# Patient Record
Sex: MALE | Race: WHITE | HISPANIC OR LATINO | Employment: FULL TIME | URBAN - METROPOLITAN AREA
[De-identification: names, ages, dates, MRNs, and addresses within clinical notes are randomized per-mention and may not be internally consistent; named-entity substitution may affect disease eponyms.]

---

## 2019-06-01 ENCOUNTER — HOSPITAL ENCOUNTER (EMERGENCY)
Facility: HOSPITAL | Age: 29
Discharge: HOME | End: 2019-06-01
Attending: EMERGENCY MEDICINE

## 2019-06-01 VITALS
HEART RATE: 72 BPM | OXYGEN SATURATION: 98 % | WEIGHT: 183 LBS | TEMPERATURE: 99 F | HEIGHT: 69 IN | BODY MASS INDEX: 27.11 KG/M2 | DIASTOLIC BLOOD PRESSURE: 72 MMHG | SYSTOLIC BLOOD PRESSURE: 130 MMHG | RESPIRATION RATE: 18 BRPM

## 2019-06-01 DIAGNOSIS — R60.9 SWELLING: ICD-10-CM

## 2019-06-01 DIAGNOSIS — M25.461 EFFUSION OF RIGHT PREPATELLAR BURSA: Primary | ICD-10-CM

## 2019-06-01 PROCEDURE — 25000003 PHARM REV CODE 250: Performed by: NURSE PRACTITIONER

## 2019-06-01 PROCEDURE — 99283 EMERGENCY DEPT VISIT LOW MDM: CPT

## 2019-06-01 RX ORDER — NAPROXEN 500 MG/1
500 TABLET ORAL 2 TIMES DAILY WITH MEALS
Qty: 20 TABLET | Refills: 0 | Status: SHIPPED | OUTPATIENT
Start: 2019-06-01 | End: 2019-12-03 | Stop reason: CLARIF

## 2019-06-01 RX ORDER — NAPROXEN 500 MG/1
500 TABLET ORAL
Status: COMPLETED | OUTPATIENT
Start: 2019-06-01 | End: 2019-06-01

## 2019-06-01 RX ADMIN — NAPROXEN 500 MG: 500 TABLET ORAL at 08:06

## 2019-06-02 NOTE — ED PROVIDER NOTES
Encounter Date: 6/1/2019       History     Chief Complaint   Patient presents with    Knee Pain     Patient presents to the ED with reports of having right knee swelling x 1 week. Denies any pain.      27 y/o male with no reported PMH presents for painless R knee swelling x 2 days.  Pt states about 1 week ago he hit his knee with a hammer while at work but there was no swelling until two days ago. Pt denies F, pain, decreased ROM or N/V.    The history is provided by the patient.     Review of patient's allergies indicates:  No Known Allergies  History reviewed. No pertinent past medical history.  History reviewed. No pertinent surgical history.  History reviewed. No pertinent family history.  Social History     Tobacco Use    Smoking status: Never Smoker   Substance Use Topics    Alcohol use: No    Drug use: No     Review of Systems   Constitutional: Negative for activity change and fever.   Musculoskeletal: Positive for joint swelling. Negative for gait problem.   Skin: Positive for color change. Negative for rash and wound.   Allergic/Immunologic: Negative for immunocompromised state.   Neurological: Negative for weakness and numbness.   Hematological: Does not bruise/bleed easily.   All other systems reviewed and are negative.      Physical Exam     Initial Vitals [06/01/19 1935]   BP Pulse Resp Temp SpO2   128/73 73 16 98.5 °F (36.9 °C) 97 %      MAP       --         Physical Exam    Nursing note and vitals reviewed.  Constitutional: Vital signs are normal. He appears well-developed and well-nourished. He is cooperative.  Non-toxic appearance. He does not appear ill. No distress.   HENT:   Head: Atraumatic.   Eyes: Conjunctivae, EOM and lids are normal.   Neck: Full passive range of motion without pain.   Cardiovascular: Normal rate and regular rhythm.   Pulmonary/Chest: Effort normal.   Abdominal: Normal appearance. There is no tenderness.   Musculoskeletal: Normal range of motion.        Right hip:  Normal.        Right knee: He exhibits swelling, effusion and erythema. He exhibits normal range of motion, normal alignment, no LCL laxity, normal patellar mobility, no bony tenderness, normal meniscus and no MCL laxity. No tenderness found.        Right ankle: Normal.   Lymphadenopathy:     He has no cervical adenopathy.   Neurological: He is alert and oriented to person, place, and time. He has normal strength. No cranial nerve deficit or sensory deficit. Gait normal.   Skin: Skin is warm, dry and intact. Capillary refill takes less than 2 seconds. No rash noted.   Psychiatric: He has a normal mood and affect. His speech is normal and behavior is normal.         ED Course   Procedures  Labs Reviewed - No data to display       Imaging Results    None          Medical Decision Making:   Initial Assessment:    Pt presents for painless R knee swelling x 2 days.  Pt states about 1 week ago he hit his knee with a hammer while at work.   Denies F, pain, decreased ROM or N/V.   Mild prepatellar effusion with erythema.  Area is not warm to touch, no decreased ROM, pain free, NVI  Differential Diagnosis:   Prepatellar effusion, bursitis, cellulitis  ED Management:  Dr.Lefort and I examined pt, area swelling d/t prepatellar effusion from bursitis, likely due to hammer trauma 1 week ago.    Pt afebrile, area does not look cellulitic.  Ice, compression and NSAIDs.  Naproxen rx  Pt to return to ER for any concerns, a worsening or change in current condition, F, or knee becoming more swollen, painful, hot or reddened.. Pt to f/u with PCP in week if needed. Pt verbalized understanding of all d/c instructions.     Other:   I have discussed this case with another health care provider.                      Clinical Impression:       ICD-10-CM ICD-9-CM   1. Effusion of right prepatellar bursa M25.461 719.06   2. Swelling R60.9 782.3                                Devaughn Joshua NP  06/01/19 2056

## 2019-06-02 NOTE — ED TRIAGE NOTES
Patient presents to the ED with complaints of right knee swelling and redness that started 2 days ago. Patient reports 1 week ago he hit his knee with hammer while he was working. Patient denies pain to area. Able to ambulate and bend at the knee with no problems. No pain with knee manipulation by nurse. Patient denies fever at home or body chills. Patient reports he has been playing soccer since his accident so he thinks that might have aggravated it. Patient has been taking ibuprofen and and ice for swelling. Denies numbness is legs or feet.     Review of patient's allergies indicates:  No Known Allergies     Patient has verified the spelling of their name and  on armband.   APPEARANCE: Patient is alert, calm, oriented x 4, and does not appear distressed.  SKIN: Skin is normal for race, warm, and dry. Normal skin turgor and mucous membranes moist. +right knee swelling and redness. Area is soft  CARDIAC: Normal rate and rhythm, no murmur heard.   RESPIRATORY:Normal rate and effort. Breath sounds clear bilaterally throughout chest. Respirations are equal and unlabored.    GASTRO: Bowel sounds normal, abdomen is soft, no tenderness, and no abdominal distention.  MUSCLE: Full ROM. No bony tenderness or soft tissue tenderness. No obvious deformity.

## 2019-06-02 NOTE — DISCHARGE INSTRUCTIONS
Keep the knee wrapped, do apply any extra pressure to knee.  Swelling should go down in 1-2 weeks.  You can follow up with the Dr. above if not or return to the ER.

## 2019-12-03 ENCOUNTER — HOSPITAL ENCOUNTER (EMERGENCY)
Facility: HOSPITAL | Age: 29
Discharge: HOME | End: 2019-12-03
Attending: EMERGENCY MEDICINE

## 2019-12-03 VITALS
WEIGHT: 193 LBS | HEART RATE: 92 BPM | BODY MASS INDEX: 28.58 KG/M2 | SYSTOLIC BLOOD PRESSURE: 132 MMHG | HEIGHT: 69 IN | DIASTOLIC BLOOD PRESSURE: 79 MMHG | OXYGEN SATURATION: 95 % | RESPIRATION RATE: 18 BRPM | TEMPERATURE: 100 F

## 2019-12-03 DIAGNOSIS — J10.1 INFLUENZA B: Primary | ICD-10-CM

## 2019-12-03 LAB
INFLUENZA A, MOLECULAR: NEGATIVE
INFLUENZA B, MOLECULAR: POSITIVE
SPECIMEN SOURCE: ABNORMAL

## 2019-12-03 PROCEDURE — 87502 INFLUENZA DNA AMP PROBE: CPT

## 2019-12-03 PROCEDURE — 25000003 PHARM REV CODE 250: Performed by: EMERGENCY MEDICINE

## 2019-12-03 PROCEDURE — 99283 EMERGENCY DEPT VISIT LOW MDM: CPT

## 2019-12-03 RX ORDER — IBUPROFEN 600 MG/1
600 TABLET ORAL
Status: COMPLETED | OUTPATIENT
Start: 2019-12-03 | End: 2019-12-03

## 2019-12-03 RX ADMIN — IBUPROFEN 600 MG: 600 TABLET, FILM COATED ORAL at 09:12

## 2019-12-03 NOTE — ED TRIAGE NOTES
28 Y/O M with CC of Headaches and Body Aches x2 days. Denies other complaints. Has been taking Dayquil for symptoms.

## 2019-12-03 NOTE — ED PROVIDER NOTES
Encounter Date: 12/3/2019       History     Chief Complaint   Patient presents with    Generalized Body Aches     c/o headache, bodyaches, and mild cough x2 days. would like to be checked for the flu     Patient is 29-year-old male with no significant past medical history presents with subjective fever, body aches, generalized headache and nonproductive cough since 11/30/19.  He has been taking Dayquil without relief.  Denies any neck pain/stiffness, vision changes, focal numbness/weakness, sore throat, CP, SOB, abdominal pain, N/V, diarrhea, dysuria.        Review of patient's allergies indicates:  No Known Allergies  History reviewed. No pertinent past medical history.  History reviewed. No pertinent surgical history.  No family history on file.  Social History     Tobacco Use    Smoking status: Never Smoker   Substance Use Topics    Alcohol use: No    Drug use: No     Review of Systems   Constitutional: Positive for fever (subjective). Negative for chills.   HENT: Negative for congestion and rhinorrhea.    Eyes: Negative for pain and visual disturbance.   Respiratory: Positive for cough. Negative for shortness of breath.    Cardiovascular: Negative for chest pain and leg swelling.   Gastrointestinal: Negative for abdominal pain, diarrhea, nausea and vomiting.   Genitourinary: Negative for dysuria and hematuria.   Musculoskeletal: Negative for neck pain and neck stiffness.   Skin: Negative for rash.   Neurological: Positive for headaches. Negative for weakness and numbness.   Psychiatric/Behavioral: Negative for confusion.       Physical Exam     Initial Vitals [12/03/19 0812]   BP Pulse Resp Temp SpO2   132/79 92 18 99.6 °F (37.6 °C) 95 %      MAP       --         Physical Exam    Vitals reviewed.  Constitutional: He appears well-developed and well-nourished. He is not diaphoretic. No distress.   HENT:   Head: Normocephalic and atraumatic.   Right Ear: External ear normal.   Left Ear: External ear normal.    Eyes: EOM are normal.   Neck: Normal range of motion. Neck supple.   No meningismus   Cardiovascular: Normal rate, regular rhythm and normal heart sounds. Exam reveals no friction rub.    No murmur heard.  Pulmonary/Chest: Breath sounds normal. No respiratory distress. He has no wheezes. He has no rales.   Abdominal: Soft. Bowel sounds are normal. He exhibits no distension. There is no tenderness. There is no rebound and no guarding.   Musculoskeletal: Normal range of motion.   Neurological: He is alert and oriented to person, place, and time. GCS score is 15. GCS eye subscore is 4. GCS verbal subscore is 5. GCS motor subscore is 6.   Skin: Skin is warm and dry. Capillary refill takes less than 2 seconds.   Psychiatric: He has a normal mood and affect.         ED Course   Procedures  Labs Reviewed   INFLUENZA A & B BY MOLECULAR          Imaging Results    None          Medical Decision Making:   Initial Assessment:   Patient here with flu like symptoms for several days  Differential Diagnosis:   Viral illness, influenza, sepsis  Clinical Tests:   Lab Tests: Ordered and Reviewed  ED Management:    On re-evaluation, the patient's status has improved.  After complete ED evaluation, clinical impression is most consistent with influenza B.    PCP follow-up within 2-3 days was recommended.    After taking into careful account the patient's history, physical exam findings, as well as empirical and objective data obtained throughout ED workup, I feel no emergent medical condition has been identified. No further evaluation or admission was felt to be required, and the patient is stable for discharge from the ED. The patient and any additional family present were updated with test results, overall clinical impression, and recommended further plan of care, including discharge instructions as provided and outpatient follow-up for continued evaluation and management as needed. All questions were answered. The patient expressed  understanding and agreed with current plan for discharge and follow-up plan of care. Strict ED return precautions were provided, including return/worsening of current symptoms, new symptoms, or any other concerns.                     ED Course as of Dec 03 0905   Tue Dec 03, 2019   0834 BP: 132/79 [LD]   0834 Temp: 99.6 °F (37.6 °C) [LD]   0834 Pulse: 92 [LD]   0834 Resp: 18 [LD]   0834 SpO2: 95 % [LD]   0856 Influenza B, Molecular(!): Positive [LD]      ED Course User Index  [LD] Little Han MD                Clinical Impression:       ICD-10-CM ICD-9-CM   1. Influenza B J10.1 487.1         Disposition:   Disposition: Discharged  Condition: Stable                     Little Han MD  12/03/19 0906

## 2023-02-09 ENCOUNTER — OFFICE VISIT (OUTPATIENT)
Dept: UROLOGY | Facility: CLINIC | Age: 33
End: 2023-02-09
Payer: COMMERCIAL

## 2023-02-09 VITALS
HEART RATE: 75 BPM | WEIGHT: 196.56 LBS | HEIGHT: 69 IN | DIASTOLIC BLOOD PRESSURE: 66 MMHG | SYSTOLIC BLOOD PRESSURE: 107 MMHG | BODY MASS INDEX: 29.11 KG/M2

## 2023-02-09 DIAGNOSIS — Z30.09 VASECTOMY EVALUATION: Primary | ICD-10-CM

## 2023-02-09 PROCEDURE — 99999 PR PBB SHADOW E&M-NEW PATIENT-LVL III: CPT | Mod: PBBFAC,,, | Performed by: STUDENT IN AN ORGANIZED HEALTH CARE EDUCATION/TRAINING PROGRAM

## 2023-02-09 PROCEDURE — 99204 PR OFFICE/OUTPT VISIT, NEW, LEVL IV, 45-59 MIN: ICD-10-PCS | Mod: S$GLB,,, | Performed by: STUDENT IN AN ORGANIZED HEALTH CARE EDUCATION/TRAINING PROGRAM

## 2023-02-09 PROCEDURE — 99999 PR PBB SHADOW E&M-NEW PATIENT-LVL III: ICD-10-PCS | Mod: PBBFAC,,, | Performed by: STUDENT IN AN ORGANIZED HEALTH CARE EDUCATION/TRAINING PROGRAM

## 2023-02-09 PROCEDURE — 99204 OFFICE O/P NEW MOD 45 MIN: CPT | Mod: S$GLB,,, | Performed by: STUDENT IN AN ORGANIZED HEALTH CARE EDUCATION/TRAINING PROGRAM

## 2023-02-09 NOTE — PROGRESS NOTES
Subjective:       Patient ID: Devaughn Bauer is a 32 y.o. male.    Chief Complaint:  vasectomy  This is a 32 y.o.  male patient that is new to me.  The patient was self referred to me for vasectomy discussion.     He has 3 children and his marital status is ; his kids ages are: 16, 10, 9.  He has discussed the vasectomy procedure with his significant other.     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           LAST PSA  No results found for: PSA, PSADIAG, PSATOTAL, PSAFREE    No results found for: CREATININE    ---  History reviewed. No pertinent past medical history.    History reviewed. No pertinent surgical history.    History reviewed. No pertinent family history.    Social History     Tobacco Use    Smoking status: Never   Substance Use Topics    Alcohol use: No    Drug use: No       No current outpatient medications on file prior to visit.     No current facility-administered medications on file prior to visit.       Review of patient's allergies indicates:  No Known Allergies    Review of Systems   Constitutional:  Negative for chills.   HENT:  Negative for congestion.    Eyes:  Negative for visual disturbance.   Respiratory:  Negative for shortness of breath.    Cardiovascular:  Negative for chest pain.   Gastrointestinal:  Negative for abdominal distention.   Musculoskeletal:  Negative for gait problem.   Skin:  Negative for color change.   Neurological:  Negative for dizziness.   Psychiatric/Behavioral:  Negative for agitation.      Objective:      Physical Exam  Constitutional:       Appearance: He is well-developed.   HENT:      Head: Normocephalic.   Eyes:      Pupils: Pupils are equal, round, and reactive to light.   Pulmonary:      Effort: Pulmonary effort is normal.   Abdominal:      Palpations: Abdomen is soft.   Genitourinary:     Comments: Bilateral cords slightly full (right>left) but able to palpate bilateral vas  deferens  Musculoskeletal:         General: Normal range of motion.      Cervical back: Normal range of motion.   Skin:     General: Skin is warm and dry.   Neurological:      Mental Status: He is alert.       Assessment:       1. Vasectomy evaluation        Plan:         Counselin. Vasectomy is intended to be a permanent form of contraception. It does not produce immediate sterility. Following vasectomy, another form of contraception is required until vas occlusion is confirmed by post-vasectomy semen analysis (3 months after). Even after vas occlusion is confirmed, vasectomy is not 100% reliable in preventing pregnancy. The risk of pregnancy post-vasectomy is approximately 1 in 2,000 for men for have post-vasectomy azoospermia (0.05%). Repeat vasectomy is necessary in <1% of vasectomies.   2. Patients should refrain from ejaculation for approximately 1 week after vasectomy.   3. The rates of surgical complications such as symptomatic hematoma and infection are 1-2%.   4. Chronic scrotal pain associated with negative impact on quality of life occurs after vasectomy in about 1-2% of men. Few of these men require additional surgery.   5. percocet, keflex, and valium prescriptions can be sent once pt calls us to schedule vasectomy.   6. The patient has my handout on vasectomy procedure, risks, benefits, what to expect, and the AUA guidelines. Rare incidence where a structure that has the appearance of a vas is ligated is discovered after pathology results return and the patient will require a repeat procedure to ligate the vas under anesthesia as a result. All questions were answered.Surgical consents will be signed on the day of the procedure.   7. Patient does not take blood thinning medications. If he does, he was told to stop the blood thinning medications 1 week before the vasectomy.        Vasectomy evaluation

## 2023-05-02 ENCOUNTER — TELEPHONE (OUTPATIENT)
Dept: UROLOGY | Facility: CLINIC | Age: 33
End: 2023-05-02
Payer: COMMERCIAL

## 2023-05-02 NOTE — TELEPHONE ENCOUNTER
----- Message from Megan Manzo sent at 5/2/2023  8:21 AM CDT -----  Type:  Needs Medical Advice    Who Called: Pt  Would the patient rather a call back or a response via [facility]? call  Best Call Back Number: [phone]  Additional Information: Pt would like a call in regards to co-pay and amount for surgery ( Vasectomy) please call pt

## 2023-05-02 NOTE — TELEPHONE ENCOUNTER
Spoke with patient and schedule an re exam appointment and schedule procedure dated. Patient voice his understanding.

## 2023-05-02 NOTE — TELEPHONE ENCOUNTER
----- Message from Bing Alcantara sent at 5/2/2023 10:26 AM CDT -----  Type:  Needs Medical Advice    Who Called: pt  Symptoms (please be specific): pt is requesting to be scheduled for a vasectomy and the pt is requesting to have it done  on 06/02/2023 if possible     Would the patient rather a call back or a response via [facility]? call  Best Call Back Number: [phone]  Additional Information:

## 2023-05-02 NOTE — TELEPHONE ENCOUNTER
Called patient no answer, left message to [facility] [phone] and CPT code 49087 for vasectomy.

## 2023-05-23 ENCOUNTER — TELEPHONE (OUTPATIENT)
Dept: UROLOGY | Facility: CLINIC | Age: 33
End: 2023-05-23
Payer: COMMERCIAL

## 2023-05-23 NOTE — TELEPHONE ENCOUNTER
----- Message from Carolina Hough sent at 5/23/2023  1:42 PM CDT -----  Devaughn Bauer calling regarding Appointment Access PT is scheduled for 05/25/23  at 8:40 am but wanted to see if there was a later time that day to come in instead, call back to inform [phone]

## 2023-05-23 NOTE — TELEPHONE ENCOUNTER
Spoke with patient, requesting a late evening appointment.  Unavailable, he would like to reschedule appointment.  Offered 6/8 at 1500 hours.  He accepted as this works better with job schedule.

## 2023-06-08 ENCOUNTER — TELEPHONE (OUTPATIENT)
Dept: UROLOGY | Facility: CLINIC | Age: 33
End: 2023-06-08
Payer: COMMERCIAL

## 2023-06-08 NOTE — TELEPHONE ENCOUNTER
15 min after his visit, will transfer care to another provider as I am departing the hospital system and  will be unable to accommodate his elective procedure.

## 2025-09-05 ENCOUNTER — OFFICE VISIT (OUTPATIENT)
Dept: URGENT CARE | Facility: CLINIC | Age: 35
End: 2025-09-05
Payer: COMMERCIAL

## 2025-09-05 VITALS
TEMPERATURE: 99 F | HEART RATE: 94 BPM | BODY MASS INDEX: 31.22 KG/M2 | RESPIRATION RATE: 18 BRPM | OXYGEN SATURATION: 100 % | HEIGHT: 68 IN | SYSTOLIC BLOOD PRESSURE: 135 MMHG | DIASTOLIC BLOOD PRESSURE: 94 MMHG | WEIGHT: 206 LBS

## 2025-09-05 DIAGNOSIS — S60.041A CONTUSION OF RIGHT RING FINGER WITHOUT DAMAGE TO NAIL, INITIAL ENCOUNTER: ICD-10-CM

## 2025-09-05 DIAGNOSIS — S69.91XA INJURY OF RIGHT RING FINGER, INITIAL ENCOUNTER: Primary | ICD-10-CM

## 2025-09-05 DIAGNOSIS — S60.141A SUBUNGUAL HEMATOMA OF RIGHT RING FINGER: ICD-10-CM
